# Patient Record
Sex: FEMALE | Race: WHITE
[De-identification: names, ages, dates, MRNs, and addresses within clinical notes are randomized per-mention and may not be internally consistent; named-entity substitution may affect disease eponyms.]

---

## 2019-04-09 ENCOUNTER — HOSPITAL ENCOUNTER (EMERGENCY)
Dept: HOSPITAL 4 - SED | Age: 59
LOS: 1 days | Discharge: HOME | End: 2019-04-10
Payer: COMMERCIAL

## 2019-04-09 VITALS — HEIGHT: 64 IN | BODY MASS INDEX: 30.73 KG/M2 | WEIGHT: 180 LBS

## 2019-04-09 VITALS — SYSTOLIC BLOOD PRESSURE: 139 MMHG

## 2019-04-09 DIAGNOSIS — Z90.89: ICD-10-CM
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
## 2019-04-09 DIAGNOSIS — S52.122A: Primary | ICD-10-CM

## 2019-04-09 DIAGNOSIS — Z88.8: ICD-10-CM
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
## 2019-04-09 DIAGNOSIS — Z88.5: ICD-10-CM

## 2019-04-09 DIAGNOSIS — R03.0: ICD-10-CM

## 2019-04-09 DIAGNOSIS — Z88.0: ICD-10-CM

## 2019-04-09 DIAGNOSIS — Z88.1: ICD-10-CM
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
## 2019-04-09 PROCEDURE — 73110 X-RAY EXAM OF WRIST: CPT

## 2019-04-09 PROCEDURE — 96374 THER/PROPH/DIAG INJ IV PUSH: CPT

## 2019-04-09 PROCEDURE — 99284 EMERGENCY DEPT VISIT MOD MDM: CPT

## 2019-04-09 PROCEDURE — 73070 X-RAY EXAM OF ELBOW: CPT

## 2019-04-09 PROCEDURE — 24600 TX CLSD ELBOW DISLC W/O ANES: CPT

## 2019-04-09 PROCEDURE — 73080 X-RAY EXAM OF ELBOW: CPT

## 2019-04-09 NOTE — NUR
Patient AOx4, ambulatory, presents to ER with complaint of pain 10/10 to left 
arm radiating to left hand. Patient states that the fell earlier today and 
landed on left arm. Patient states that she tripped over something as she was 
walking. Patient was seen at urgent care and promted to come to ER for further 
eval. Patient presents with a splint to LFA and shoulder immobilizer. No other 
symptoms or complaints.

## 2019-04-10 VITALS — SYSTOLIC BLOOD PRESSURE: 122 MMHG

## 2019-04-10 NOTE — NUR
Patient given written and verbal discharge instructions and verbalizes 
understanding.  ER MD discussed with patient the results and treatment 
provided. Patient in stable condition. ID arm band removed. IV catheter removed 
intact and dressing applied, no active bleeding. Rx of Naproxen, Zofran, and 
Norco 5/325mg given. Patient educated on pain management and to follow up with 
PMD. Pain Scale 2/10 tolerable to patient. Opportunity for questions provided 
and answered. Medication side effect fact sheet provided.